# Patient Record
(demographics unavailable — no encounter records)

---

## 2025-06-24 NOTE — HISTORY OF PRESENT ILLNESS
[FreeTextEntry1] : CPE, re-establish care [de-identified] : 38yo male with PMH of bipolar disorder, anxiety, ADHD, chronic pain who presents to the office for annual physical examination and to re-establish care.  Previously seen in our office with last visit being 3/2022.   He has a history of bipolar disorder and depression and follows with psychiatrist. Takes Buspirone 30mg, Seroquel 300mg and Depakote.   Has history of chronic pain and follows with pain management Dr Maye Ribeiro, per  is prescribed Percocet 5-325mg TID and Lyrica 100mg BID.  He also has history of low back pain, will be obtaining an MRI for evaluation. Planning to start steroid injections.   He reports that he was previously taking sildenafil for management of ED. He was taking 100mg and would like a refill of this. Finds that his psychiatric medication causes difficulty with obtaining an erection.  He is a current smoker, wants to quit on his birthday for his daughter.   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 He usually starts on the left side of his client and when he switches to the right side he can occasionally feel dizzy. Reports that it takes a few moments for him to regain his composure and it will pass. Happens infrequently and not with every client. Seen by ENT in the past, thought to be vertigo.

## 2025-06-24 NOTE — ASSESSMENT
[Vaccines Reviewed] : Immunizations reviewed today. Please see immunization details in the vaccine log within the immunization flowsheet.  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 Elected for patches

## 2025-06-24 NOTE — PHYSICAL EXAM
[No Acute Distress] : no acute distress [Well-Appearing] : well-appearing [Normal Sclera/Conjunctiva] : normal sclera/conjunctiva [EOMI] : extraocular movements intact [Normal Outer Ear/Nose] : the outer ears and nose were normal in appearance [Normal Oropharynx] : the oropharynx was normal [No Lymphadenopathy] : no lymphadenopathy [No Respiratory Distress] : no respiratory distress  [Clear to Auscultation] : lungs were clear to auscultation bilaterally [Normal Rate] : normal rate  [Regular Rhythm] : with a regular rhythm [Normal S1, S2] : normal S1 and S2 [Soft] : abdomen soft [Non Tender] : non-tender [Non-distended] : non-distended [No Rash] : no rash [Coordination Grossly Intact] : coordination grossly intact [No Focal Deficits] : no focal deficits [Normal Gait] : normal gait [Normal Insight/Judgement] : insight and judgment were intact

## 2025-06-24 NOTE — HEALTH RISK ASSESSMENT
[Good] : ~his/her~  mood as  good [No] : No [Yes] : In the past 12 months have you used drugs other than those required for medical reasons? 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

## 2025-06-24 NOTE — REVIEW OF SYSTEMS
[Fever] : no fever [Chills] : no chills [Earache] : no earache [Nasal Discharge] : no nasal discharge [Sore Throat] : no sore throat [Chest Pain] : no chest pain [Palpitations] : no palpitations [Shortness Of Breath] : no shortness of breath [Cough] : no cough [Abdominal Pain] : no abdominal pain [Nausea] : no nausea [Constipation] : no constipation [Diarrhea] : no diarrhea [Vomiting] : no vomiting [Dysuria] : no dysuria [Frequency] : no frequency [Headache] : no headache [Dizziness] : no dizziness